# Patient Record
Sex: FEMALE | Race: WHITE | NOT HISPANIC OR LATINO | URBAN - METROPOLITAN AREA
[De-identification: names, ages, dates, MRNs, and addresses within clinical notes are randomized per-mention and may not be internally consistent; named-entity substitution may affect disease eponyms.]

---

## 2019-01-31 ENCOUNTER — NEW PATIENT (OUTPATIENT)
Dept: URBAN - METROPOLITAN AREA CLINIC 24 | Facility: CLINIC | Age: 64
End: 2019-01-31
Payer: COMMERCIAL

## 2019-01-31 DIAGNOSIS — H43.393 OTHER VITREOUS OPACITIES, BILATERAL: ICD-10-CM

## 2019-01-31 DIAGNOSIS — H25.13 AGE-RELATED NUCLEAR CATARACT, BILATERAL: ICD-10-CM

## 2019-01-31 DIAGNOSIS — H40.1134 PRIMARY OPEN-ANGLE GLAUCOMA, INDETERMINATE, BILATERAL: ICD-10-CM

## 2019-01-31 DIAGNOSIS — Z79.84 LONG TERM (CURRENT) USE OF ORAL ANTIDIABETIC DRUGS: ICD-10-CM

## 2019-01-31 DIAGNOSIS — H17.13 CENTRAL CORNEAL OPACITY, BILATERAL: ICD-10-CM

## 2019-01-31 DIAGNOSIS — E11.9 TYPE 2 DIABETES MELLITUS WITHOUT COMPLICATIONS: Primary | ICD-10-CM

## 2019-01-31 PROCEDURE — 92014 COMPRE OPH EXAM EST PT 1/>: CPT | Performed by: OPTOMETRIST

## 2019-01-31 PROCEDURE — 92134 CPTRZ OPH DX IMG PST SGM RTA: CPT | Performed by: OPTOMETRIST

## 2019-01-31 PROCEDURE — 76514 ECHO EXAM OF EYE THICKNESS: CPT | Performed by: OPTOMETRIST

## 2019-01-31 PROCEDURE — 92004 COMPRE OPH EXAM NEW PT 1/>: CPT | Performed by: OPTOMETRIST

## 2019-01-31 PROCEDURE — 92133 CPTRZD OPH DX IMG PST SGM ON: CPT | Performed by: OPTOMETRIST

## 2019-01-31 ASSESSMENT — KERATOMETRY
OD: 38.31
OS: 36.92

## 2019-01-31 ASSESSMENT — VISUAL ACUITY
OS: 20/25
OD: 20/40

## 2019-01-31 ASSESSMENT — INTRAOCULAR PRESSURE
OD: 17
OS: 15

## 2020-09-23 ENCOUNTER — OFFICE VISIT (OUTPATIENT)
Dept: URBAN - METROPOLITAN AREA CLINIC 22 | Facility: CLINIC | Age: 65
End: 2020-09-23
Payer: COMMERCIAL

## 2020-09-23 DIAGNOSIS — H25.813 COMBINED FORMS OF AGE-RELATED CATARACT, BILATERAL: Primary | ICD-10-CM

## 2020-09-23 DIAGNOSIS — H17.9 CORNEAL SCAR: ICD-10-CM

## 2020-09-23 DIAGNOSIS — H40.012 OPEN ANGLE WITH BORDERLINE FINDINGS, LOW RISK, LEFT EYE: ICD-10-CM

## 2020-09-23 PROCEDURE — 99204 OFFICE O/P NEW MOD 45 MIN: CPT | Performed by: OPTOMETRIST

## 2020-09-23 ASSESSMENT — INTRAOCULAR PRESSURE
OS: 17
OD: 24

## 2020-09-23 ASSESSMENT — VISUAL ACUITY
OD: 20/40
OS: 20/25

## 2020-09-23 NOTE — IMPRESSION/PLAN
Impression: Open angle with borderline findings, low risk, left eye: H40.012. Left.  Plan: continue to monitor, order OCT

## 2020-09-23 NOTE — IMPRESSION/PLAN
Impression: Type 2 diabetes mellitus w/o complication: L57.9. Bilateral. Plan: RTC 1 year for [address]. Recommend continued BS monitoring and scheduled visits with PCP/ENDO.

## 2020-09-23 NOTE — IMPRESSION/PLAN
Impression: Combined forms of age-related cataract, bilateral: H25.813 Bilateral. Plan: refer for cat eval with glare testing and sx OD>OS

## 2020-10-21 ENCOUNTER — OFFICE VISIT (OUTPATIENT)
Dept: URBAN - METROPOLITAN AREA CLINIC 23 | Facility: CLINIC | Age: 65
End: 2020-10-21
Payer: COMMERCIAL

## 2020-10-21 DIAGNOSIS — H25.12 AGE-RELATED NUCLEAR CATARACT, LEFT EYE: ICD-10-CM

## 2020-10-21 DIAGNOSIS — H17.89 OTHER CORNEAL SCARS AND OPACITIES: ICD-10-CM

## 2020-10-21 PROCEDURE — 99204 OFFICE O/P NEW MOD 45 MIN: CPT | Performed by: OPHTHALMOLOGY

## 2020-10-21 PROCEDURE — 92025 CPTRIZED CORNEAL TOPOGRAPHY: CPT | Performed by: OPHTHALMOLOGY

## 2020-10-21 RX ORDER — PREDNISOLONE ACETATE 10 MG/ML
1 % SUSPENSION/ DROPS OPHTHALMIC
Qty: 10 | Refills: 1 | Status: INACTIVE
Start: 2020-10-21 | End: 2020-11-21

## 2020-10-21 RX ORDER — OFLOXACIN 3 MG/ML
0.3 % SOLUTION/ DROPS OPHTHALMIC
Qty: 5 | Refills: 1 | Status: INACTIVE
Start: 2020-10-21 | End: 2020-11-21

## 2020-10-21 ASSESSMENT — INTRAOCULAR PRESSURE
OS: 20
OD: 20

## 2020-10-21 ASSESSMENT — VISUAL ACUITY
OS: 20/40
OD: 20/200

## 2020-10-21 ASSESSMENT — KERATOMETRY: OD: 36.00

## 2020-10-21 NOTE — IMPRESSION/PLAN
Impression: Age-related nuclear cataract, right eye: H25.11. Condition: quality of life issue. Symptoms: could improve with surgery. Vision: vision affected. S/P RK OU Plan: Cataracts account for the patient's complaints. Discussed all risks, benefits, procedures and recovery. Patient understands changing glasses will not improve vision. Patient desires to have surgery, recommend phacoemulsification with intraocular lens. RL-2 Recommend standard IOL Aim plano.

## 2020-11-10 ENCOUNTER — PRE-OPERATIVE VISIT (OUTPATIENT)
Dept: URBAN - METROPOLITAN AREA CLINIC 23 | Facility: CLINIC | Age: 65
End: 2020-11-10
Payer: COMMERCIAL

## 2020-11-10 DIAGNOSIS — H25.11 AGE-RELATED NUCLEAR CATARACT, RIGHT EYE: Primary | ICD-10-CM

## 2020-11-10 PROCEDURE — 76519 ECHO EXAM OF EYE: CPT | Performed by: OPHTHALMOLOGY

## 2020-11-10 ASSESSMENT — PACHYMETRY
OD: 26.27
OD: 4.00
OS: 26.28
OS: 3.96

## 2020-11-20 ENCOUNTER — SURGERY (OUTPATIENT)
Dept: URBAN - METROPOLITAN AREA SURGERY 11 | Facility: SURGERY | Age: 65
End: 2020-11-20
Payer: COMMERCIAL

## 2020-11-20 PROCEDURE — 66984 XCAPSL CTRC RMVL W/O ECP: CPT | Performed by: OPHTHALMOLOGY

## 2020-11-21 ENCOUNTER — POST-OPERATIVE VISIT (OUTPATIENT)
Dept: URBAN - METROPOLITAN AREA CLINIC 23 | Facility: CLINIC | Age: 65
End: 2020-11-21
Payer: COMMERCIAL

## 2020-11-21 DIAGNOSIS — Z48.810 ENCOUNTER FOR SURGICAL AFTERCARE FOLLOWING SURGERY ON A SENSE ORGAN: Primary | ICD-10-CM

## 2020-11-21 PROCEDURE — 99024 POSTOP FOLLOW-UP VISIT: CPT | Performed by: OPTOMETRIST

## 2020-11-21 RX ORDER — PREDNISOLONE ACETATE 10 MG/ML
1 % SUSPENSION/ DROPS OPHTHALMIC
Qty: 10 | Refills: 1 | Status: INACTIVE
Start: 2020-11-21 | End: 2020-12-18

## 2020-11-21 RX ORDER — OFLOXACIN 3 MG/ML
0.3 % SOLUTION/ DROPS OPHTHALMIC
Qty: 5 | Refills: 1 | Status: INACTIVE
Start: 2020-11-21 | End: 2020-11-28

## 2020-11-21 ASSESSMENT — INTRAOCULAR PRESSURE
OS: 17
OD: 20

## 2020-11-21 NOTE — IMPRESSION/PLAN
Impression: S/P CE/Standard IOL SA60WF 24.00 w/ORA OD - 1 Day. Encounter for surgical aftercare following surgery on a sense organ  Z48.810.  Plan: 1 wk PO2 --Continue all meds

## 2020-11-25 ENCOUNTER — POST-OPERATIVE VISIT (OUTPATIENT)
Dept: URBAN - METROPOLITAN AREA CLINIC 22 | Facility: CLINIC | Age: 65
End: 2020-11-25
Payer: COMMERCIAL

## 2020-11-25 PROCEDURE — 99024 POSTOP FOLLOW-UP VISIT: CPT | Performed by: OPTOMETRIST

## 2020-11-25 ASSESSMENT — INTRAOCULAR PRESSURE
OD: 22
OS: 16
OD: 17

## 2020-11-25 NOTE — IMPRESSION/PLAN
Impression: S/P CE/Standard IOL SA60WF 24.00 w/ORA OD - 5 Days. Encounter for surgical aftercare following surgery on a sense organ  Z48.810. Plan: continue all drops and maintain next apt on 12/03. call if any issues arise. --Continue all meds--Advised patient to use artificial tears for comfort.

## 2020-12-10 ENCOUNTER — SURGERY (OUTPATIENT)
Dept: URBAN - METROPOLITAN AREA SURGERY 11 | Facility: SURGERY | Age: 65
End: 2020-12-10
Payer: COMMERCIAL

## 2020-12-10 PROCEDURE — 66984 XCAPSL CTRC RMVL W/O ECP: CPT | Performed by: OPHTHALMOLOGY

## 2020-12-11 ENCOUNTER — POST-OPERATIVE VISIT (OUTPATIENT)
Dept: URBAN - METROPOLITAN AREA CLINIC 22 | Facility: CLINIC | Age: 65
End: 2020-12-11
Payer: COMMERCIAL

## 2020-12-11 PROCEDURE — 99024 POSTOP FOLLOW-UP VISIT: CPT | Performed by: OPTOMETRIST

## 2020-12-11 ASSESSMENT — INTRAOCULAR PRESSURE
OS: 22
OD: 22

## 2020-12-11 NOTE — IMPRESSION/PLAN
Impression: Encounter for surgical aftercare following surgery on a sense organ: Z48.810. Left.  Plan: Start Ofloxacin QID x 1 week OS, PredForte QID x 1 week then taper

## 2020-12-17 ENCOUNTER — POST-OPERATIVE VISIT (OUTPATIENT)
Dept: URBAN - METROPOLITAN AREA CLINIC 22 | Facility: CLINIC | Age: 65
End: 2020-12-17
Payer: COMMERCIAL

## 2020-12-17 PROCEDURE — 99024 POSTOP FOLLOW-UP VISIT: CPT | Performed by: OPTOMETRIST

## 2020-12-17 ASSESSMENT — INTRAOCULAR PRESSURE
OS: 18
OD: 20

## 2020-12-17 NOTE — IMPRESSION/PLAN
Impression: S/P Cataract Extraction by phacoemulsification with IOL placement; ORA OS - 7 Days. Presence of intraocular lens  Z96.1.  Condition is improving - Plan: finish tapering drops, returning in 2-3 weeks for post op and refraction --Continue all meds

## 2020-12-29 ENCOUNTER — POST-OPERATIVE VISIT (OUTPATIENT)
Dept: URBAN - METROPOLITAN AREA CLINIC 22 | Facility: CLINIC | Age: 65
End: 2020-12-29
Payer: COMMERCIAL

## 2020-12-29 DIAGNOSIS — H52.223 REGULAR ASTIGMATISM, BILATERAL: Primary | ICD-10-CM

## 2020-12-29 DIAGNOSIS — Z96.1 PRESENCE OF INTRAOCULAR LENS: ICD-10-CM

## 2020-12-29 PROCEDURE — 99024 POSTOP FOLLOW-UP VISIT: CPT | Performed by: OPTOMETRIST

## 2020-12-29 ASSESSMENT — VISUAL ACUITY
OD: 20/30
OS: 20/25

## 2020-12-29 ASSESSMENT — INTRAOCULAR PRESSURE
OS: 16
OD: 19

## 2020-12-29 NOTE — IMPRESSION/PLAN
Impression: S/P Cataract Extraction by phacoemulsification with IOL placement; ORA OS - 19 Days. Presence of intraocular lens  Z96.1. Plan: Rx new glasses, pt must return if any problems occur. --Finish all meds--Advised patient to use artificial tears for comfort.

## 2021-01-15 ENCOUNTER — TESTING ONLY (OUTPATIENT)
Dept: URBAN - METROPOLITAN AREA CLINIC 22 | Facility: CLINIC | Age: 66
End: 2021-01-15

## 2021-01-15 PROCEDURE — V2799 MISC VISION ITEM OR SERVICE: HCPCS | Performed by: OPTOMETRIST

## 2021-01-15 ASSESSMENT — VISUAL ACUITY
OD: 20/30
OS: 20/25

## 2021-01-15 NOTE — IMPRESSION/PLAN
Impression: Regular astigmatism, bilateral: H52.223. Bilateral. Plan: Rx check, minor changes and wrote computer rx.

## 2022-11-10 ENCOUNTER — OFFICE VISIT (OUTPATIENT)
Dept: URBAN - METROPOLITAN AREA CLINIC 24 | Facility: CLINIC | Age: 67
End: 2022-11-10
Payer: MEDICARE

## 2022-11-10 DIAGNOSIS — E11.9 TYPE 2 DIABETES MELLITUS WITHOUT COMPLICATIONS: Primary | ICD-10-CM

## 2022-11-10 DIAGNOSIS — H17.89 OTHER CORNEAL SCARS AND OPACITIES: ICD-10-CM

## 2022-11-10 DIAGNOSIS — Z79.84 LONG TERM (CURRENT) USE OF ORAL ANTIDIABETIC DRUGS: ICD-10-CM

## 2022-11-10 DIAGNOSIS — H40.013 OPEN ANGLE WITH BORDERLINE FINDINGS, LOW RISK, BILATERAL: ICD-10-CM

## 2022-11-10 DIAGNOSIS — Z96.1 PRESENCE OF INTRAOCULAR LENS: ICD-10-CM

## 2022-11-10 PROCEDURE — 92133 CPTRZD OPH DX IMG PST SGM ON: CPT | Performed by: OPTOMETRIST

## 2022-11-10 PROCEDURE — 92014 COMPRE OPH EXAM EST PT 1/>: CPT | Performed by: OPTOMETRIST

## 2022-11-10 PROCEDURE — 92025 CPTRIZED CORNEAL TOPOGRAPHY: CPT | Performed by: OPTOMETRIST

## 2022-11-10 ASSESSMENT — INTRAOCULAR PRESSURE
OD: 19
OS: 19

## 2022-11-10 ASSESSMENT — KERATOMETRY
OS: 34.85
OD: 37.92

## 2022-11-10 ASSESSMENT — VISUAL ACUITY
OS: 20/30
OD: 20/50

## 2022-11-10 NOTE — IMPRESSION/PLAN
Impression: Other corneal scars and opacities: H17.89.
-- S/P RK OU Plan: Limiting issue. Updated shawn today; irregular cyl. Pts visual needs currently met w/ SRx. Will continue w/ hab rx.

## 2022-11-10 NOTE — IMPRESSION/PLAN
Impression: Open angle with borderline findings, low risk, bilateral: H40.013.
-- per CDR, history. -- denies fohx glc Plan: IOP today 19 / 19 Updated rnfl stable (borderline, +ppa ou) Will continue to monitor suspicion w/o pharmacologic tx. Stressed importance of continued observation. Pt advised future tx may be indicated.

## 2022-11-10 NOTE — IMPRESSION/PLAN
Impression: Long term (current) use of oral antidiabetic drugs: Z79.84.  Plan: See other diabetic plan

## 2023-09-05 ENCOUNTER — OFFICE VISIT (OUTPATIENT)
Dept: URBAN - METROPOLITAN AREA CLINIC 22 | Facility: CLINIC | Age: 68
End: 2023-09-05

## 2023-09-05 DIAGNOSIS — H52.03 HYPERMETROPIA, BILATERAL: Primary | ICD-10-CM

## 2023-09-05 PROCEDURE — 99203 OFFICE O/P NEW LOW 30 MIN: CPT | Performed by: STUDENT IN AN ORGANIZED HEALTH CARE EDUCATION/TRAINING PROGRAM

## 2023-09-05 ASSESSMENT — VISUAL ACUITY
OD: 20/60
OS: 20/30

## 2023-09-05 ASSESSMENT — INTRAOCULAR PRESSURE
OS: 16
OD: 17

## 2023-10-02 ENCOUNTER — TECH ONLY (OUTPATIENT)
Dept: URBAN - METROPOLITAN AREA CLINIC 23 | Facility: CLINIC | Age: 68
End: 2023-10-02
Payer: MEDICARE

## 2023-10-02 ENCOUNTER — OFFICE VISIT (OUTPATIENT)
Dept: URBAN - METROPOLITAN AREA CLINIC 22 | Facility: CLINIC | Age: 68
End: 2023-10-02
Payer: MEDICARE

## 2023-10-02 DIAGNOSIS — H35.371 PUCKERING OF MACULA, RIGHT EYE: ICD-10-CM

## 2023-10-02 DIAGNOSIS — H17.89 OTHER CORNEAL SCARS AND OPACITIES: Primary | ICD-10-CM

## 2023-10-02 DIAGNOSIS — Z96.1 PRESENCE OF INTRAOCULAR LENS: ICD-10-CM

## 2023-10-02 DIAGNOSIS — H40.013 OPEN ANGLE WITH BORDERLINE FINDINGS, LOW RISK, BILATERAL: ICD-10-CM

## 2023-10-02 DIAGNOSIS — H43.813 VITREOUS DEGENERATION, BILATERAL: ICD-10-CM

## 2023-10-02 PROCEDURE — 92134 CPTRZ OPH DX IMG PST SGM RTA: CPT | Performed by: STUDENT IN AN ORGANIZED HEALTH CARE EDUCATION/TRAINING PROGRAM

## 2023-10-02 PROCEDURE — 99214 OFFICE O/P EST MOD 30 MIN: CPT | Performed by: STUDENT IN AN ORGANIZED HEALTH CARE EDUCATION/TRAINING PROGRAM

## 2023-10-02 PROCEDURE — 92025 CPTRIZED CORNEAL TOPOGRAPHY: CPT | Performed by: STUDENT IN AN ORGANIZED HEALTH CARE EDUCATION/TRAINING PROGRAM

## 2023-10-02 PROCEDURE — 92133 CPTRZD OPH DX IMG PST SGM ON: CPT | Performed by: STUDENT IN AN ORGANIZED HEALTH CARE EDUCATION/TRAINING PROGRAM

## 2023-10-02 ASSESSMENT — INTRAOCULAR PRESSURE
OS: 15
OS: 15
OD: 14
OD: 14

## 2023-11-08 ENCOUNTER — OFFICE VISIT (OUTPATIENT)
Dept: URBAN - METROPOLITAN AREA CLINIC 23 | Facility: CLINIC | Age: 68
End: 2023-11-08
Payer: MEDICARE

## 2023-11-08 DIAGNOSIS — H35.371 PUCKERING OF MACULA, RIGHT EYE: Primary | ICD-10-CM

## 2023-11-08 PROCEDURE — 99204 OFFICE O/P NEW MOD 45 MIN: CPT | Performed by: OPHTHALMOLOGY

## 2023-11-08 PROCEDURE — 92134 CPTRZ OPH DX IMG PST SGM RTA: CPT | Performed by: OPHTHALMOLOGY

## 2023-11-08 RX ORDER — PREDNISOLONE ACETATE 10 MG/ML
1 % SUSPENSION/ DROPS OPHTHALMIC
Qty: 10 | Refills: 1 | Status: ACTIVE
Start: 2023-11-08

## 2023-11-08 RX ORDER — OFLOXACIN 3 MG/ML
0.3 % SOLUTION/ DROPS OPHTHALMIC
Qty: 5 | Refills: 1 | Status: ACTIVE
Start: 2023-11-08

## 2023-11-08 ASSESSMENT — INTRAOCULAR PRESSURE
OD: 14
OS: 13

## 2023-12-05 ENCOUNTER — SURGERY (OUTPATIENT)
Dept: URBAN - METROPOLITAN AREA SURGERY 11 | Facility: SURGERY | Age: 68
End: 2023-12-05
Payer: MEDICARE

## 2023-12-05 DIAGNOSIS — H26.491 OTHER SECONDARY CATARACT, RIGHT EYE: Primary | ICD-10-CM

## 2023-12-05 PROCEDURE — 67041 VIT FOR MACULAR PUCKER: CPT | Performed by: OPHTHALMOLOGY

## 2023-12-06 ENCOUNTER — POST-OPERATIVE VISIT (OUTPATIENT)
Dept: URBAN - METROPOLITAN AREA CLINIC 23 | Facility: CLINIC | Age: 68
End: 2023-12-06
Payer: MEDICARE

## 2023-12-06 PROCEDURE — 99024 POSTOP FOLLOW-UP VISIT: CPT | Performed by: OPTOMETRIST

## 2023-12-06 ASSESSMENT — INTRAOCULAR PRESSURE
OS: 14
OD: 17

## 2023-12-12 ENCOUNTER — POST-OPERATIVE VISIT (OUTPATIENT)
Dept: URBAN - METROPOLITAN AREA CLINIC 23 | Facility: CLINIC | Age: 68
End: 2023-12-12
Payer: MEDICARE

## 2023-12-12 PROCEDURE — 99024 POSTOP FOLLOW-UP VISIT: CPT | Performed by: OPHTHALMOLOGY

## 2023-12-12 ASSESSMENT — INTRAOCULAR PRESSURE
OS: 12
OD: 12

## 2024-01-23 ENCOUNTER — POST-OPERATIVE VISIT (OUTPATIENT)
Dept: URBAN - METROPOLITAN AREA CLINIC 23 | Facility: CLINIC | Age: 69
End: 2024-01-23
Payer: MEDICARE

## 2024-01-23 DIAGNOSIS — Z48.810 ENCOUNTER FOR SURGICAL AFTERCARE FOLLOWING SURGERY ON A SENSE ORGAN: Primary | ICD-10-CM

## 2024-01-23 PROCEDURE — 99024 POSTOP FOLLOW-UP VISIT: CPT | Performed by: OPHTHALMOLOGY

## 2024-01-23 ASSESSMENT — INTRAOCULAR PRESSURE
OD: 19
OS: 15

## 2024-02-13 ENCOUNTER — OFFICE VISIT (OUTPATIENT)
Dept: URBAN - METROPOLITAN AREA CLINIC 23 | Facility: CLINIC | Age: 69
End: 2024-02-13

## 2024-02-13 DIAGNOSIS — H52.03 HYPERMETROPIA, BILATERAL: Primary | ICD-10-CM

## 2024-02-13 ASSESSMENT — VISUAL ACUITY
OS: 20/40
OD: 20/60

## 2024-04-03 ENCOUNTER — OFFICE VISIT (OUTPATIENT)
Dept: URBAN - METROPOLITAN AREA CLINIC 23 | Facility: CLINIC | Age: 69
End: 2024-04-03

## 2024-04-03 DIAGNOSIS — H52.03 HYPERMETROPIA, BILATERAL: Primary | ICD-10-CM

## 2024-04-03 PROCEDURE — 92015 DETERMINE REFRACTIVE STATE: CPT | Performed by: OPTOMETRIST

## 2024-04-03 ASSESSMENT — VISUAL ACUITY
OS: 20/20
OD: 20/20